# Patient Record
Sex: MALE | Race: WHITE | NOT HISPANIC OR LATINO | URBAN - METROPOLITAN AREA
[De-identification: names, ages, dates, MRNs, and addresses within clinical notes are randomized per-mention and may not be internally consistent; named-entity substitution may affect disease eponyms.]

---

## 2017-01-01 ENCOUNTER — OFFICE VISIT - HEALTHEAST (OUTPATIENT)
Dept: INTERNAL MEDICINE | Facility: CLINIC | Age: 82
End: 2017-01-01

## 2017-01-01 ENCOUNTER — HOME CARE/HOSPICE - HEALTHEAST (OUTPATIENT)
Dept: HOSPICE | Facility: HOSPICE | Age: 82
End: 2017-01-01

## 2017-01-01 ENCOUNTER — COMMUNICATION - HEALTHEAST (OUTPATIENT)
Dept: SCHEDULING | Facility: CLINIC | Age: 82
End: 2017-01-01

## 2017-01-01 ENCOUNTER — COMMUNICATION - HEALTHEAST (OUTPATIENT)
Dept: INTERNAL MEDICINE | Facility: CLINIC | Age: 82
End: 2017-01-01

## 2017-01-01 ENCOUNTER — COMMUNICATION - HEALTHEAST (OUTPATIENT)
Dept: CARDIOLOGY | Facility: CLINIC | Age: 82
End: 2017-01-01

## 2017-01-01 ENCOUNTER — RECORDS - HEALTHEAST (OUTPATIENT)
Dept: ADMINISTRATIVE | Facility: OTHER | Age: 82
End: 2017-01-01

## 2017-01-01 ENCOUNTER — AMBULATORY - HEALTHEAST (OUTPATIENT)
Dept: CARDIOLOGY | Facility: CLINIC | Age: 82
End: 2017-01-01

## 2017-01-01 ENCOUNTER — AMBULATORY - HEALTHEAST (OUTPATIENT)
Dept: HOSPICE | Facility: HOSPICE | Age: 82
End: 2017-01-01

## 2017-01-01 ENCOUNTER — SURGERY - HEALTHEAST (OUTPATIENT)
Dept: GASTROENTEROLOGY | Facility: HOSPITAL | Age: 82
End: 2017-01-01

## 2017-01-01 ENCOUNTER — OFFICE VISIT - HEALTHEAST (OUTPATIENT)
Dept: CARDIOLOGY | Facility: CLINIC | Age: 82
End: 2017-01-01

## 2017-01-01 ENCOUNTER — HOSPITAL ENCOUNTER (OUTPATIENT)
Dept: CARDIOLOGY | Facility: CLINIC | Age: 82
Discharge: HOME | End: 2017-07-25
Attending: INTERNAL MEDICINE

## 2017-01-01 ENCOUNTER — SURGERY - HEALTHEAST (OUTPATIENT)
Dept: CARDIOLOGY | Facility: CLINIC | Age: 82
End: 2017-01-01

## 2017-01-01 ENCOUNTER — HOSPITAL ENCOUNTER (OUTPATIENT)
Dept: CT IMAGING | Facility: CLINIC | Age: 82
Discharge: HOME | End: 2017-01-11
Attending: INTERNAL MEDICINE

## 2017-01-01 DIAGNOSIS — R19.7 DIARRHEA: ICD-10-CM

## 2017-01-01 DIAGNOSIS — I49.9 CARDIAC DYSRHYTHMIA: ICD-10-CM

## 2017-01-01 DIAGNOSIS — D69.6 THROMBOCYTOPENIA (H): ICD-10-CM

## 2017-01-01 DIAGNOSIS — I48.0 PAROXYSMAL ATRIAL FIBRILLATION (H): ICD-10-CM

## 2017-01-01 DIAGNOSIS — E78.5 HYPERLIPIDEMIA: ICD-10-CM

## 2017-01-01 DIAGNOSIS — D45 POLYCYTHEMIA VERA (H): ICD-10-CM

## 2017-01-01 DIAGNOSIS — R42 DIZZINESS AND GIDDINESS: ICD-10-CM

## 2017-01-01 DIAGNOSIS — D72.829 LEUKOCYTOSIS: ICD-10-CM

## 2017-01-01 DIAGNOSIS — C95.00: ICD-10-CM

## 2017-01-01 DIAGNOSIS — K86.89 PANCREATIC DUCT STONES: ICD-10-CM

## 2017-01-01 DIAGNOSIS — I48.91 A-FIB (H): ICD-10-CM

## 2017-01-01 DIAGNOSIS — K86.89 PANCREATIC INSUFFICIENCY: ICD-10-CM

## 2017-01-01 DIAGNOSIS — K52.9 COLITIS: ICD-10-CM

## 2017-01-01 DIAGNOSIS — R63.4 WEIGHT LOSS: ICD-10-CM

## 2017-01-01 DIAGNOSIS — R10.9 ABDOMINAL PAIN: ICD-10-CM

## 2017-01-01 DIAGNOSIS — C95.00 ACUTE LEUKEMIA NOT HAVING ACHIEVED REMISSION (H): ICD-10-CM

## 2017-01-01 DIAGNOSIS — I10 ESSENTIAL HYPERTENSION WITH GOAL BLOOD PRESSURE LESS THAN 140/90: ICD-10-CM

## 2017-01-01 DIAGNOSIS — I48.91 ATRIAL FIBRILLATION (H): ICD-10-CM

## 2017-01-01 DIAGNOSIS — L71.9 ROSACEA: ICD-10-CM

## 2017-01-01 LAB
ATRIAL RATE - MUSE: 60 BPM
DIASTOLIC BLOOD PRESSURE - MUSE: NORMAL MMHG
INTERPRETATION ECG - MUSE: NORMAL
LAB AP CHARGES (HE HISTORICAL CONVERSION): ABNORMAL
P AXIS - MUSE: 17 DEGREES
PATH REPORT.COMMENTS IMP SPEC: ABNORMAL
PATH REPORT.COMMENTS IMP SPEC: ABNORMAL
PATH REPORT.FINAL DX SPEC: ABNORMAL
PATH REPORT.MICROSCOPIC SPEC OTHER STN: ABNORMAL
PR INTERVAL - MUSE: 204 MS
QRS DURATION - MUSE: 108 MS
QT - MUSE: 404 MS
QTC - MUSE: 404 MS
R AXIS - MUSE: -54 DEGREES
RESULT FLAG (HE HISTORICAL CONVERSION): ABNORMAL
SYSTOLIC BLOOD PRESSURE - MUSE: NORMAL MMHG
T AXIS - MUSE: 49 DEGREES
VENTRICULAR RATE- MUSE: 60 BPM

## 2017-01-01 RX ORDER — MIRTAZAPINE 7.5 MG/1
7.5 TABLET, FILM COATED ORAL AT BEDTIME
Qty: 30 TABLET | Status: SHIPPED | OUTPATIENT
Start: 2017-01-01

## 2017-01-01 RX ORDER — HYDROCODONE BITARTRATE AND ACETAMINOPHEN 10; 325 MG/1; MG/1
1 TABLET ORAL EVERY 4 HOURS PRN
Status: SHIPPED | COMMUNITY
Start: 2017-01-01

## 2017-01-01 RX ORDER — AMOXICILLIN 250 MG
1-2 CAPSULE ORAL 2 TIMES DAILY PRN
Status: SHIPPED | COMMUNITY
Start: 2017-01-01

## 2017-01-01 RX ORDER — HYDROMORPHONE HYDROCHLORIDE 1 MG/ML
1 SOLUTION ORAL EVERY 4 HOURS
Status: SHIPPED | COMMUNITY
Start: 2017-01-01

## 2017-01-01 RX ORDER — BISACODYL 10 MG
10 SUPPOSITORY, RECTAL RECTAL DAILY PRN
Status: SHIPPED | COMMUNITY
Start: 2017-01-01

## 2017-01-01 RX ORDER — HYDROMORPHONE HYDROCHLORIDE 1 MG/ML
1 SOLUTION ORAL
Status: SHIPPED | COMMUNITY
Start: 2017-01-01

## 2017-01-01 RX ORDER — HALOPERIDOL 2 MG/ML
1 SOLUTION ORAL EVERY 4 HOURS
Status: SHIPPED | COMMUNITY
Start: 2017-01-01

## 2017-01-01 RX ORDER — LORAZEPAM 0.5 MG/1
0.5-2 TABLET ORAL SEE ADMIN INSTRUCTIONS
Status: SHIPPED | COMMUNITY
Start: 2017-01-01

## 2017-01-01 ASSESSMENT — MIFFLIN-ST. JEOR
SCORE: 1317.63
SCORE: 1319.9
SCORE: 1262.75
SCORE: 1308.56
SCORE: 1331.24

## 2017-09-11 ENCOUNTER — HOME CARE/HOSPICE - HEALTHEAST (OUTPATIENT)
Dept: HOSPICE | Facility: HOSPICE | Age: 82
End: 2017-09-11

## 2021-05-25 ENCOUNTER — RECORDS - HEALTHEAST (OUTPATIENT)
Dept: ADMINISTRATIVE | Facility: CLINIC | Age: 86
End: 2021-05-25

## 2021-05-30 VITALS — HEIGHT: 67 IN | BODY MASS INDEX: 23.7 KG/M2 | WEIGHT: 151 LBS

## 2021-05-30 VITALS — WEIGHT: 140.9 LBS | BODY MASS INDEX: 22.11 KG/M2 | HEIGHT: 67 IN

## 2021-05-30 VITALS — WEIGHT: 146 LBS | BODY MASS INDEX: 22.87 KG/M2

## 2021-05-31 ENCOUNTER — RECORDS - HEALTHEAST (OUTPATIENT)
Dept: ADMINISTRATIVE | Facility: CLINIC | Age: 86
End: 2021-05-31

## 2021-05-31 VITALS — BODY MASS INDEX: 23.26 KG/M2 | HEIGHT: 68 IN

## 2021-05-31 VITALS — BODY MASS INDEX: 24.48 KG/M2 | HEIGHT: 67 IN | WEIGHT: 156 LBS

## 2021-05-31 VITALS — HEIGHT: 67 IN | BODY MASS INDEX: 24.09 KG/M2 | WEIGHT: 153.5 LBS

## 2021-05-31 VITALS — HEIGHT: 67 IN | WEIGHT: 153 LBS | BODY MASS INDEX: 24.01 KG/M2

## 2021-06-08 NOTE — PROGRESS NOTES
"   [facility] Follow Up Note    Mele SRINIVASAN Pahr   85 y.o. male    Date of Visit: 1/6/2017    Chief Complaint   Patient presents with     Hypertension     Subjective  Mele comes in today accompanied by one of his sons.  His blood pressure was running high and his son increased his amlodipine to 10 mg in the morning and 5 mg in the evening.  Mele also has been having diarrhea sometimes 3-4 times a day with some fecal urgency.  Does not seem to be related to diet.  His son doesn't think he's eating very much as he buys him and ensure and he doesn't even drink a half a can a day.  He's been complaining of some abdominal pain and eating a lot of Tums.  He denies any heartburn.    ROS A comprehensive review of systems was performed and was otherwise negative    Social History:   Social History     Social History Narrative    He was  (12/2014).  He has 4 children (one son is an anesthesiologist), 1 daughter and 3 sons and 8 grandchildren and 1 great grandchild.  He lives alone in a house.       Medications were reconciled.  Allergies, social and family history, and the problem list were all reviewed and updated.    Old records reviewed:  Abdominal CT scan done in preparation for his TAVR back in 2015 showed no abnormalities    Exam  General Appearance: Pleasant and alert  Vitals:    01/06/17 1046   BP: 130/70   Pulse: 66   Resp: 16   Weight: 140 lb 14.4 oz (63.9 kg)   Height: 5' 7\" (1.702 m)      Body mass index is 22.07 kg/(m^2).  Wt Readings from Last 3 Encounters:   01/06/17 140 lb 14.4 oz (63.9 kg)   05/06/16 150 lb 8 oz (68.3 kg)   03/04/16 149 lb 11.2 oz (67.9 kg)     HEENT: Sclera are clear.   Lungs: Normal respirations  Cardiac: RRR with 1/6 sys murmur  Abdomen: Soft and nondistended, questionable liver enlargement right upper quadrant  Extremities: No edema  Skin: No rashes  Neuro: Moves all extremities and has facial symmetry  Gait: Ambulates with a normal gait    Assessment/Plan  1. " Essential hypertension with goal blood pressure less than 140/90  We'll adjust his blood pressure medication by increasing his losartan and changing the amlodipine back 10 mg daily.  - Basic Metabolic Panel  - HM2(CBC w/o Differential)    2. Weight loss  I'm concerned about a potential malignancy.  He also seems to have ill fitting dentures with some mouth sores and he seems to think this is the reason he hasn't been eating as much.  I encouraged him to try to get at least 1 can of ensure and a big all of ice cream in daily and to switch to whole milk to add calories.  Return to clinic in 4 weeks for recheck.  - CT Abdomen Pelvis With Oral With IV Contrast; Future  - Vitamin B12    3. Diarrhea  He does have an irritable bowel and significant anxiety issues which could explain the above.  If his CT scan and blood tests are normal, could think about starting him on some mirtazapine at night to help with his appetite and anxiety.  - CT Abdomen Pelvis With Oral With IV Contrast; Future  - Thyroid Stimulating Hormone (TSH)  - Hepatic Profile    4. Abdominal pain  Check labs and above.  - H. pylori Antibody, IgG      April MARSHALL Montalvo MD  1/6/2017    Much or all of the text in this note was generated through the use of Dragon Dictate voice-to-text software. Errors in spelling or words which seem out of context are unintentional. Sound alike errors, in particular, may have escaped editing.

## 2021-06-08 NOTE — PROGRESS NOTES
[facility] Follow Up Note    Mele SRINIVASAN Pahr   85 y.o. male    Date of Visit: 2/17/2017    Chief Complaint   Patient presents with     Follow-up     Subjective  Mele comes in today accompanied by his daughter who is visiting from out of town.  His main caregiver son, Silvestre, is gone for 2 weeks.  He was discharged yesterday from the hospital where he was in for 2 nights with infectious with likely a component of ischemic colitis.  The week before he had an endoscopic ultrasound to further evaluate pancreatic stones and insufficiency and he has been taking pancreatic enzymes for the past month and has noted an improvement in his diarrhea and he has gained 3 pounds.  He is supposed to get a colonoscopy in about 2 months and he was discharged on oral ciprofloxacin and metronidazole.  His daughter feels like he is doing well.  He has had normal brown stool today.  He is eating well.  He denies any concerns to me today.    ROS A comprehensive review of systems was performed and was otherwise negative    Social History:   Social History     Social History Narrative    He was  (12/2014).  He has 4 children (one son is an anesthesiologist), 1 daughter and 3 sons and 8 grandchildren and 1 great grandchild.  He lives alone in a house.       Medications were reconciled.  Allergies, social and family history, and the problem list were all reviewed and updated.    Old records reviewed: Records are [facility] are reviewed, negative C. difficile, white blood cell count was down to 13.8 (high as 28.5), other stool cultures so far are negative    Exam  General Appearance: Pleasant and alert  Vitals:    02/17/17 1104   BP: 142/54   Patient Site: Left Arm   Patient Position: Sitting   Cuff Size: Adult Regular   Pulse: 72   Weight: 146 lb (66.2 kg)      Body mass index is 22.87 kg/(m^2).  Wt Readings from Last 3 Encounters:   02/17/17 146 lb (66.2 kg)   02/16/17 143 lb 3.2 oz (65 kg)   01/06/17 140 lb 14.4 oz  (63.9 kg)     HEENT: Sclera are clear.   Lungs: Normal respirations  Abdomen: Soft and nondistended  Extremities: No edema  Skin: Rosacea over the nose and cheeks  Neuro: Moves all extremities and has facial symmetry  Gait: Ambulates with a normal gait    Assessment/Plan  1. Colitis  He is doing quite well and recuperated quickly.  I suspect there was an element of ischemic colitis along with potential infectious as he recovered so quickly.  He will finish the Cipro and metronidazole. The plan is for a colonoscopy in 2 months.    2. Rosacea  He was started on clindamycin topical gel.  Hesitant to use oral doxycycline.  We will see what it looks like in 3 weeks and he returns to clinic.    3. Pancreatic insufficiency  Continue with the pancreatic enzymes.  He can take them up to 4 times daily with meals and snacks if needed.    4. Pancreatic duct stones  He will be following up with GI, he may need pancreatic intervention in the future but so far observation is planned along with medications as noted above.    5. Polycythemia vera  He remains on Hydrea.  Hemoglobin is stable.        Shayna Montalvo MD  2/17/2017    Much or all of the text in this note was generated through the use of Dragon Dictate voice-to-text software. Errors in spelling or words which seem out of context are unintentional. Sound alike errors, in particular, may have escaped editing.

## 2021-06-10 NOTE — PROGRESS NOTES
"[facility] Follow Up Note    Mele SRINIVASAN Pahr   85 y.o. male    Date of Visit: 4/14/2017    Chief Complaint   Patient presents with     Hospital Visit Follow Up     Subjective  Mele comes in today accompanied by his son.  He was in the ER recently for dizziness.  He finally is now convinced he needs to drink more water.  He was given a liter of fluids in the ER and felt better.  We again talked about he needs to drink so much water throughout the day and then also needs to stand up slowly and not move right away after standing.  His diarrhea is well controlled on pancreatic enzymes and he did see [facility] since I saw him and the plan is to continue with this.    ROS A comprehensive review of systems was performed and was otherwise negative    Social History:   Social History     Social History Narrative    He was  (12/2014).  He has 4 children (one son is an anesthesiologist), 1 daughter and 3 sons and 8 grandchildren and 1 great grandchild.  He lives alone in a house.       Medications were reconciled.  Allergies, social and family history, and the problem list were all reviewed and updated.    Old records reviewed: Records from [facility], ER records    Exam  General Appearance: Pleasant and alert  Vitals:    04/14/17 1011   BP: 142/82   Pulse: 62   Weight: 151 lb (68.5 kg)   Height: 5' 7\" (1.702 m)      Body mass index is 23.65 kg/(m^2).  Wt Readings from Last 3 Encounters:   04/14/17 151 lb (68.5 kg)   02/17/17 146 lb (66.2 kg)   02/16/17 143 lb 3.2 oz (65 kg)     HEENT: Sclera are clear.   Lungs: Normal respirations  Cardiac: Regular rate and rhythm  Abdomen: Soft and nondistended  Extremities: No edema  Skin: No rashes  Neuro: Moves all extremities and has facial symmetry  Gait: Ambulates with a normal gait    Assessment/Plan  1. Dizziness  Reiterated how much he needs to stay hydrated and is okay for him to have some salt.  He has multiple comorbidities that make it more difficult " for him to stay hydrated and he needs to drink at least 6 glasses of water a day.  Gained some weight which should help.    2. Essential hypertension with goal blood pressure less than 140/90  Blood pressure slightly over at times.  Would like to avoid any other medications for now and they will continue to monitor this at home.      Billing today was based on time. Total visit time of  30 minutes of direct patient contact with greater than 50% of this time devoted to counseling and/or coordinating care consisting of discussing diagnostic results, discussion about prognosis, risk and benefits of management options, importance of compliance with treatment options, patient education, discussing prescription medication including dosage, side effects, and precautions and discussing nonprescription OTC vitamins/minerals including dosage, side effects and precautions.        Shayna Montalvo MD  4/14/2017    Much or all of the text in this note was generated through the use of Dragon Dictate voice-to-text software. Errors in spelling or words which seem out of context are unintentional. Sound alike errors, in particular, may have escaped editing.

## 2021-06-12 NOTE — PROGRESS NOTES
"[facility] Follow Up Note    Mele SRINIVASAN Pahr   85 y.o. male    Date of Visit: 8/29/2017    Chief Complaint   Patient presents with     GI Problem     Medication Management     Subjective  Mele comes in today accompanied by 1 of his sons.  He has been having a \"upset stomach\" with decreased appetite for the past 2-3 weeks.  He is currently in the process of getting a continuous loop recorder implanted for his atrial fibrillation as he did not wish to be on chronic anticoagulation.  He also has known pancreatic insufficiency and does take enzymes with meals but had not been using them with snacks.  His weight has been fairly stable but he like food to taste better and he would like more of an appetite.  He gets occasional loose stools.    ROS A comprehensive review of systems was performed and was otherwise negative    Social History:   Social History     Social History Narrative    He was  (12/2014).  He has 4 children (one son is an anesthesiologist), 1 daughter and 3 sons and 8 grandchildren and 1 great grandchild.  He lives alone in a house.       Medications were reconciled.  Allergies, social and family history, and the problem list were all reviewed and updated.    Old records reviewed: Cardiology records    Exam  General Appearance: Pleasant and alert  Vitals:    08/29/17 0948   BP: 138/60   Pulse: 78   Resp: 10   Weight: 153 lb (69.4 kg)   Height: 5' 7\" (1.702 m)      Body mass index is 23.96 kg/(m^2).  Wt Readings from Last 3 Encounters:   08/29/17 153 lb (69.4 kg)   08/24/17 153 lb 8 oz (69.6 kg)   07/19/17 156 lb (70.8 kg)     HEENT: Sclera are clear.   Lungs: Normal respirations  Abdomen: Soft and nondistended  Extremities: No edema  Skin: No rashes  Neuro: Moves all extremities and has facial symmetry  Gait: Ambulates with a normal gait    Assessment/Plan  1. Essential hypertension with goal blood pressure less than 140/90  [facility] and is in the " process of switching from metoprolol XL to plain metoprolol and we did discuss the different dosing.  - Basic Metabolic Panel  - HM2(CBC w/o Differential)    2. Paroxysmal atrial fibrillation  He is getting a loop recorder implanted.  He does not wish to be on long-term anticoagulation unless need be.    3. Pancreatic insufficiency  May be causing some of the stomach queasiness.  He should increase the enzymes to with meals and if he has a snack as well.  Would also recommend starting him on mirtazapine 7.5 mg at night to help with his appetite.  Could also try acid reducing agent such as Pepcid and/or Zantac if this does not help.    Addendum:  4.  Thrombocytopenia  We rechecked his platelet count today as it was slightly low when he had his first procedure done in cardiology, the results show it has dropped even further, I spoke with hematology oncology, the one covering for his primary hematologist, Dr. Niño, and they completely agree that Hydrea is likely causing some toxicity which in retrospect may also be causing some GI symptoms as well.  They will hold the Hydrea and recheck platelet count in 2 weeks.  I spoke with a different son, Dr. Krzysztof Christopher, about this as well as hematology and we are all in agreement.  We will likely hold off on starting the mirtazapine as noted above for a couple of weeks as his GI symptoms may improve with holding the Hydrea.      Shayna Montalvo MD  8/29/2017    Much or all of the text in this note was generated through the use of Dragon Dictate voice-to-text software. Errors in spelling or words which seem out of context are unintentional. Sound alike errors, in particular, may have escaped editing.

## 2021-06-12 NOTE — PROGRESS NOTES
9/23/1931  [phone] (home)  There is no such number on file (mobile).    +++Important patient information for [facility] [place] [place]/Cath Lab staff : None+++    [facility] EP Cath Lab Procedure Order     Device Implant/Revision:  Procedure: New Implant  Device Type: Single Loop  Device Company/Device Rep Needed for Procedure: [employer]    Diagnosis:  AF  Anticipated Case Duration:  Standard  Scheduling Needs/Timeframe:  Next Available  Anesthesia:  None  Research Protocol:  No    [facility] EP Cath Lab Prep   Ordering Provider: Dr Plunkett  Ordering Date: 8-24-17  Orders Status: Intial order placed and Order set placed  EP NC Contact: Violeta Roman RN    H&P:  Compled by MEKA  on 8-24-17  PCP: Shayna Montalvo MD, [phone]    Pre-op Labs: N/A for procedure    Medical Records Pertinent for Procedure:  Holter MIKE 7-25-17 no afib noted, Echo 7-15-17 EF 60-65% and EKG 8-24-17 SR @60,  7-14-17 Afib @59    Patient Education:    Teach with Patient: Completed via phone prior to procedure    Risks Reviewed:     Pacemaker Insertion    <1% for each of the following:  infection, bleeding, hematoma, pneumothorax, subclavian vein thrombosis, cardiac perforation, cardiac tamponade, arrhythmias, pectoral or diaphragmatic stimulation, air embolus, pocket erosion, device interactions.    <4% lead dislodgment, <1% lead fracture or generator  malfunction.    <0.5% CVA or MI.    <0.1% death.    If external defibrillation or CV is needed, 25% risk for superficial burn.    For patients on anticoagulation, the risk of bleeding, hematoma and tamponade are increased.      Consent: Will be obtained in [facility] [place] [place] day of procedure    Pre-Procedure Instructions that were Reviewed with Patient:  NPO after midnight, Notified patient of time and date of procedure by CV , Transportation arrangements needed s/p procedure, Post-procedure follow up process, Sedation plan/orders and Pre-procedure letter was sent to pt by CV     Pre-Procedure Medication  Instructions:  Instructions given to pt regarding anticoagulants: Xarelto- instructed to continue anticoagulation uninterrupted through their procedure  Instructions given to pt regarding antiarrhythmic medication: Beta Blocker; Pt instructed to continue medication prior to procedure  Instructions for medication, other than anticoagulants/antiarrhythmics listed above, given to pt: to take all morning medications with small sips of water, with the exception of OTC supplements and MVI      No Known Allergies    Current Outpatient Prescriptions:      acetaminophen (TYLENOL) 500 MG tablet, Take 500 mg by mouth every 4 (four) hours as needed for pain. EVERY 4 - 6 HOURS AS NEEDED , Disp: , Rfl:      amLODIPine (NORVASC) 10 MG tablet, Take 1 tablet (10 mg total) by mouth daily., Disp: 90 tablet, Rfl: 5     artificial tears,hypromellose, (GENTEAL; SYSTANE) 0.3 % Gel, Artificial tears as needed throughout the day, Disp: , Rfl: 0     aspirin 81 MG EC tablet, Take 81 mg by mouth daily., Disp: , Rfl:      clindamycin (CLINDAGEL) 1 % gel, Apply to affected area 2 times daily (Patient taking differently: Apply 1 application topically 2 (two) times a day as needed. Apply to affected area 2 times daily), Disp: 30 g, Rfl: prn     hydroxyurea (HYDREA) 500 mg capsule, Take 500 mg by mouth daily. , Disp: , Rfl:      lipase-protease-amylase (CREON) 24,000-76,000 -120,000 unit CpDR capsule, Take 24,000 units of lipase by mouth 3 (three) times a day with meals., Disp: , Rfl:      loratadine (CLARITIN) 10 mg tablet, Take 10 mg by mouth daily., Disp: , Rfl:      losartan (COZAAR) 100 MG tablet, Take 1 tablet (100 mg total) by mouth bedtime., Disp: 90 tablet, Rfl: prn     metoprolol succinate (TOPROL XL) 50 MG 24 hr tablet, Take 3 tablets (150 mg total) by mouth daily., Disp: 90 tablet, Rfl: prn     OMEGA-3/DHA/EPA/FISH OIL (FISH OIL-OMEGA-3 FATTY ACIDS) 300-1,000 mg capsule, Take 2 g by mouth daily., Disp: , Rfl:      rivaroxaban 20 mg Tab,  Take 1 tablet (20 mg total) by mouth daily with supper., Disp: 90 tablet, Rfl: 1     simvastatin (ZOCOR) 10 MG tablet, Take 10 mg by mouth at bedtime. , Disp: , Rfl:      spironolactone (ALDACTONE) 25 MG tablet, Take 1 tablet (25 mg total) by mouth daily. For blood pressure, Disp: 90 tablet, Rfl: 5     XARELTO 20 mg Tab, TAKE 1 TABLET (20 MG TOTAL) BY MOUTH DAILY WITH SUPPER., Disp: 30 tablet, Rfl: prn

## 2021-06-15 PROBLEM — K86.89 PANCREATIC INSUFFICIENCY: Status: ACTIVE | Noted: 2017-01-01

## 2021-06-15 PROBLEM — K86.89 PANCREATIC DUCT STONES: Status: ACTIVE | Noted: 2017-01-01

## 2021-06-15 PROBLEM — K52.9 COLITIS: Status: ACTIVE | Noted: 2017-01-01

## 2021-06-16 PROBLEM — I48.0 PAROXYSMAL ATRIAL FIBRILLATION (H): Status: ACTIVE | Noted: 2017-01-01

## 2021-06-16 PROBLEM — C95.00 LEUKEMIA, ACUTE (H): Status: ACTIVE | Noted: 2017-01-01

## 2021-06-16 PROBLEM — I48.91 ATRIAL FIBRILLATION (H): Status: ACTIVE | Noted: 2017-01-01

## 2021-06-25 NOTE — PROGRESS NOTES
Progress Notes by Lupe Plunkett MD at 8/24/2017 10:30 AM     Author: Lupe Plunkett MD Service: -- Author Type: Physician    Filed: 8/24/2017 11:04 AM Encounter Date: 8/24/2017 Status: Signed    : Lupe Plunkett MD (Physician)                 Arrhythmia [facility]    Thank you, Dr. Shayna Montalvo MD, for asking the [facility] Arrhythmia team to evaluate Mele Christopher in consultation for atrial fibrillation      Assessment:     85 year old male with history of aortic valve surgery, CAD, CABG who presents for evaluation of lone atrial fibrillation     Plan:     Atrial fibrillation - mildly symptomatic but single episode only and spontaneously converted to normal sinus rhythm.  Additionally, 2 week event monitor demonstrates no recurrence.  I reviewed treatment options with Mele and his son.  Given his elevated chads2-vasc score of 4, he has an indication for anticoagulation.  However, he is not tolerating his Xarelto well and does not want to be on coumadin.  Additionally, I see no evidence of recurrent AF.  Options include stopping oral anticoagulation and restarting only if he has another episode, stopping and implanting loop recorder for closer monitoring of AF recurrence, continuing oral anticoagulation, or proceeding to Watchman device.    After discussion, Mele and his son would prefer to proceed with loop recorder implant and discontinuation of oral anticoagulation.  They are aware I would recommend resumption of anticoagulation if we see any recurrence of AF.  I do believe they would be interested in Watchman procedure if Mele required long term anticoagulation.    We will call to schedule loop recorder implant at his convenience.     Current History:   Mele Christopher is a 85 y.o. male with a history of CAD, HTN, TAVR and lone episode of AF.  He was seen in the hospital and was noted to spontaneously convert to normal rhythm.  He was started on  Xarelto for oral anticoagulation due to elevated chads2-vasc score.  He is having trouble tolerating this medication due to stomach upset.  He additionally has episodes of severe nosebleeds, and has chronic dizziness and is a falls risk.  He wore a 2 week monitor demonstrating no recurrence of AF.  Echocardiogram in hospital was normal with evidence of TAVR valve.  He presents today for further evaluation.    Past Medical History:     Past Medical History:   Diagnosis Date   ? CAD (coronary artery disease)     had ami at time of cabg he believes.  no interventions since cabg 3 vessel 15 yrs ago,.   ? Hyperlipidemia    ? Hypertension    ? S/P CABG x 3     15 yrs ago       Past Surgical History:     Past Surgical History:   Procedure Laterality Date   ? AORTIC VALVE REPLACEMENT  2015    [facility] 2015   ? ESOPHAGOGASTRODUODENOSCOPY N/A 2017    Procedure: ENDOSCOPIC ULTRASOUND;  Surgeon: Arturo Jimenez MD;  Location: [facility] [place];  Service:    ? LA CABG, VEIN, SINGLE      Description: CABG (CABG);  Recorded: 10/11/2013;  Comments:        Family History:     Family History   Problem Relation Age of Onset   ? Heart disease Father       age 71   ? Heart disease Mother       age 77       Social History:    reports that he has never smoked. He does not have any smokeless tobacco history on file. He reports that he does not drink alcohol or use illicit drugs.    Meds:     Current Outpatient Prescriptions:   ?  acetaminophen (TYLENOL) 500 MG tablet, Take 500 mg by mouth every 4 (four) hours as needed for pain. EVERY 4 - 6 HOURS AS NEEDED , Disp: , Rfl:   ?  amLODIPine (NORVASC) 10 MG tablet, Take 1 tablet (10 mg total) by mouth daily., Disp: 90 tablet, Rfl: 5  ?  artificial tears,hypromellose, (GENTEAL; SYSTANE) 0.3 % Gel, Artificial tears as needed throughout the day, Disp: , Rfl: 0  ?  aspirin 81 MG EC tablet, Take 81 mg by mouth daily., Disp: , Rfl:   ?  clindamycin (CLINDAGEL) 1 % gel, Apply  "to affected area 2 times daily (Patient taking differently: Apply 1 application topically 2 (two) times a day as needed. Apply to affected area 2 times daily), Disp: 30 g, Rfl: prn  ?  hydroxyurea (HYDREA) 500 mg capsule, Take 500 mg by mouth daily. , Disp: , Rfl:   ?  lipase-protease-amylase (CREON) 24,000-76,000 -120,000 unit CpDR capsule, Take 24,000 units of lipase by mouth 3 (three) times a day with meals., Disp: , Rfl:   ?  loratadine (CLARITIN) 10 mg tablet, Take 10 mg by mouth daily., Disp: , Rfl:   ?  losartan (COZAAR) 100 MG tablet, Take 1 tablet (100 mg total) by mouth bedtime., Disp: 90 tablet, Rfl: prn  ?  metoprolol succinate (TOPROL XL) 50 MG 24 hr tablet, Take 3 tablets (150 mg total) by mouth daily., Disp: 90 tablet, Rfl: prn  ?  OMEGA-3/DHA/EPA/FISH OIL (FISH OIL-OMEGA-3 FATTY ACIDS) 300-1,000 mg capsule, Take 2 g by mouth daily., Disp: , Rfl:   ?  simvastatin (ZOCOR) 10 MG tablet, Take 10 mg by mouth at bedtime. , Disp: , Rfl:   ?  spironolactone (ALDACTONE) 25 MG tablet, Take 1 tablet (25 mg total) by mouth daily. For blood pressure, Disp: 90 tablet, Rfl: 5  ?  XARELTO 20 mg Tab, TAKE 1 TABLET (20 MG TOTAL) BY MOUTH DAILY WITH SUPPER., Disp: 30 tablet, Rfl: prn  ?  rivaroxaban 20 mg Tab, Take 1 tablet (20 mg total) by mouth daily with supper., Disp: 90 tablet, Rfl: 1    Allergies:   Review of patient's allergies indicates no known allergies.    Review of Systems:   A full 12 point review of systems without pertinent positives except as per HPI or below.        Objective:      Physical Exam  Weight: Weight: 153 lb 8 oz (69.6 kg)  Body mass index is 24.04 kg/(m^2).  /56 (Patient Site: Left Arm, Patient Position: Sitting, Cuff Size: Adult Regular)  Pulse 60  Resp 16  Ht 5' 7\" (1.702 m)  Wt 153 lb 8 oz (69.6 kg)  BMI 24.04 kg/m2    General Appearance:   Nad, alert and oriented x 3   HEENT:  Mmm, perrl, glasses   Neck: No goiter noted   Chest: Midline scar   Lungs:   ctab   Cardiovascular:  "  RRR   Abdomen:  Soft and non-tender   Extremities: No clubbing or edema   Skin: No rashes                 Cardiographics  Sinus rhythm with LAFB  2 week monitor personally reviewed as above  Echo reviewed as above    Imaging  None    Lab Review   Lab Results   Component Value Date     (L) 07/14/2017    K 4.3 07/14/2017    CL 99 07/14/2017    CO2 24 07/14/2017    BUN 15 07/14/2017    CREATININE 0.80 07/15/2017    CALCIUM 9.4 07/14/2017     Lab Results   Component Value Date    WBC 10.9 07/14/2017    WBC 14.0 (H) 07/21/2014    HGB 15.7 07/14/2017    HCT 52.7 07/14/2017    MCV 78 (L) 07/14/2017     (L) 07/15/2017     Lab Results   Component Value Date    CHOL 105 10/08/2012    TRIG 138 10/08/2012    HDL 24 (L) 10/08/2012         Lupe Plunkett MD  [facility], Electrophysiology

## 2021-07-03 NOTE — ADDENDUM NOTE
Addendum Note by Laurita Mariano at 8/29/2017  4:02 PM     Author: Laurita Mariano Service: -- Author Type:     Filed: 8/29/2017  4:02 PM Encounter Date: 8/29/2017 Status: Signed    : Laurita Mariano ()    Addended by: LAURITA MARIANO on: 8/29/2017 04:02 PM        Modules accepted: Orders

## 2021-08-03 PROBLEM — I48.91 NEW ONSET A-FIB (H): Status: RESOLVED | Noted: 2017-01-01 | Resolved: 2017-01-01

## 2021-08-03 PROBLEM — E44.1 MILD MALNUTRITION (H): Status: RESOLVED | Noted: 2017-01-01 | Resolved: 2017-01-01
